# Patient Record
Sex: MALE | Race: WHITE | HISPANIC OR LATINO | Employment: FULL TIME | URBAN - METROPOLITAN AREA
[De-identification: names, ages, dates, MRNs, and addresses within clinical notes are randomized per-mention and may not be internally consistent; named-entity substitution may affect disease eponyms.]

---
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
## 2022-03-29 ENCOUNTER — HOSPITAL ENCOUNTER (EMERGENCY)
Facility: MEDICAL CENTER | Age: 22
End: 2022-03-29
Attending: EMERGENCY MEDICINE

## 2022-03-29 VITALS
HEIGHT: 67 IN | BODY MASS INDEX: 32.6 KG/M2 | TEMPERATURE: 100.6 F | DIASTOLIC BLOOD PRESSURE: 72 MMHG | HEART RATE: 76 BPM | SYSTOLIC BLOOD PRESSURE: 127 MMHG | RESPIRATION RATE: 16 BRPM | WEIGHT: 207.67 LBS | OXYGEN SATURATION: 96 %

## 2022-03-29 DIAGNOSIS — J02.9 SORE THROAT: ICD-10-CM

## 2022-03-29 DIAGNOSIS — B34.9 VIRAL SYNDROME: ICD-10-CM

## 2022-03-29 LAB — S PYO DNA SPEC NAA+PROBE: NOT DETECTED

## 2022-03-29 PROCEDURE — 99283 EMERGENCY DEPT VISIT LOW MDM: CPT

## 2022-03-29 PROCEDURE — 700111 HCHG RX REV CODE 636 W/ 250 OVERRIDE (IP): Performed by: EMERGENCY MEDICINE

## 2022-03-29 PROCEDURE — 87651 STREP A DNA AMP PROBE: CPT

## 2022-03-29 PROCEDURE — 700102 HCHG RX REV CODE 250 W/ 637 OVERRIDE(OP): Performed by: EMERGENCY MEDICINE

## 2022-03-29 PROCEDURE — A9270 NON-COVERED ITEM OR SERVICE: HCPCS | Performed by: EMERGENCY MEDICINE

## 2022-03-29 RX ORDER — ACETAMINOPHEN 500 MG
1000 TABLET ORAL ONCE
Status: COMPLETED | OUTPATIENT
Start: 2022-03-29 | End: 2022-03-29

## 2022-03-29 RX ORDER — PREDNISONE 20 MG/1
60 TABLET ORAL ONCE
Status: COMPLETED | OUTPATIENT
Start: 2022-03-29 | End: 2022-03-29

## 2022-03-29 RX ADMIN — ACETAMINOPHEN 1000 MG: 500 TABLET ORAL at 19:54

## 2022-03-29 RX ADMIN — PREDNISONE 60 MG: 20 TABLET ORAL at 18:44

## 2022-03-30 NOTE — ED NOTES
Chief Complaint   Patient presents with   • Fever     This morning   • Sore Throat     Pt ambulated to triage with above complaints. Took tylenol 5am, vaccinated for covid-19

## 2022-03-30 NOTE — ED NOTES
Pt was medicated. Pt was Dc with all follow up care. All questions were answered. Pt is ambulatory at [place].

## 2022-03-30 NOTE — ED PROVIDER NOTES
ED Provider Note    Scribed for Олег Pritchett M.D. by Fletcher Avalos. 3/29/2022  5:54 PM    Primary care provider: None reported.   Means of arrival: Walk-in  History obtained from: Patient  History limited by: None    CHIEF COMPLAINT  Chief Complaint   Patient presents with    Fever     This morning    Sore Throat       HPI  Mohamud Sheehan is a 21 y.o. male who presents to the Emergency Department for evaluation of fatigue onset this morning. He states that he woke up this morning not feeling well and after working for two hours he began experiencing a sore throat and low grade fever. Additionally, he notes that while working he began feeling weak and his back started to ache prompting him to present to the ED for evaluation. He denies any cough, change in taste or smell, dysuria, hematuria, abdominal pain, nausea, or runny nose. He took tylenol this morning, to mild alleviation. He is vaccinated for COVID-19. He denies any major past medical history.     REVIEW OF SYSTEMS  Pertinent positives include fever, headache, fatigue, generalized weakness, sore throat, and aching back pain. Pertinent negatives include no cough, change in taste or smell, dysuria, hematuria, abdominal pain, nausea, or runny nose.      PAST MEDICAL HISTORY   None reported.     SURGICAL HISTORY  patient denies any surgical history    SOCIAL HISTORY  Social History     Tobacco Use    Smoking status: Never Smoker    Smokeless tobacco: Never Used   Substance Use Topics    Alcohol use: No    Drug use: No      Social History     Substance and Sexual Activity   Drug Use No       FAMILY HISTORY  None reported.     CURRENT MEDICATIONS  Home Medications       Reviewed by Edie Basilio R.N. (Registered Nurse) on 03/29/22 at 1703  Med List Status: Complete     Medication Last Dose Status   ondansetron (ZOFRAN ODT) 4 MG TABLET DISPERSIBLE  Active                    ALLERGIES  No Known Allergies    PHYSICAL EXAM  VITAL SIGNS: /75    "Pulse (!) 115   Temp 37.6 °C (99.7 °F) (Temporal)   Resp 18   Ht 1.702 m (5' 7\")   Wt 94.2 kg (207 lb 10.8 oz)   SpO2 96%   BMI 32.53 kg/m²     Constitutional: Well developed, Well nourished, Mild distress, Non-toxic appearance.   HENT: Normocephalic, Atraumatic.  Oropharynx moist. Posterior nasal drainage, Tonsils slightly erythematous with slight exudate.  Eyes: PERRL, EOMI, Conjunctiva normal, No discharge.   Lymphatic: Positive cervical lymphadenopathy noted.   CV: Good pulses  Thorax & Lungs: No respiratory distress.   Skin: Warm, Dry, No erythema, No rash.    Musculoskeletal: No major deformities noted.   Neurologic: Awake, alert. Moves all extremities spontaneously.  Psychiatric: Affect normal, Mood normal.      LABS  Results for orders placed or performed during the hospital encounter of 03/29/22   Group A Strep by PCR    Specimen: Throat   Result Value Ref Range    Group A Strep by PCR Not Detected Not Detected         COURSE & MEDICAL DECISION MAKING  Nursing notes, VS, PMSFHx reviewed in chart.    5:54 PM - Patient seen and examined at bedside. Patient will be treated with deltasone tablet 60 mg. Ordered group A strep by PCR to evaluate his symptoms.     Decision Making:  Fever, sore throat, strep is negative.  We will have the patient take Tylenol ibuprofen for his symptoms, give the patient a dose of prednisone here.  Will discharge patient home, have the patient return with worsening symptoms.     The patient will return for new or worsening symptoms and is stable at the time of discharge.    The patient is referred to a primary physician for blood pressure management, diabetic screening, and for all other preventative health concerns.        DISPOSITION:  Patient will be discharged home in stable condition.    FOLLOW UP:  [facility], Emergency Dept  [address] [facility] [address] [phone]    If symptoms worsen    OUTPATIENT MEDICATIONS:  Discharge Medication " List as of 3/29/2022  7:59 PM          FINAL IMPRESSION  1. Sore throat    2. Viral syndrome          I, Fletcher Avalos (Scribe), am scribing for, and in the presence of, Олег Pritchett M.D..    Electronically signed by: Fletcher Avalos (Scribteddy), 3/29/2022    Олег HOYOS M.D. personally performed the services described in this documentation, as scribed by Fletcher Avalos in my presence, and it is both accurate and complete.    The note accurately reflects work and decisions made by me.  Олег Pritchett M.D.  3/29/2022  8:30 PM

## 2023-05-11 ENCOUNTER — NON-PROVIDER VISIT (OUTPATIENT)
Dept: OCCUPATIONAL MEDICINE | Facility: CLINIC | Age: 23
End: 2023-05-11

## 2023-05-11 DIAGNOSIS — Z02.1 PRE-EMPLOYMENT DRUG SCREENING: ICD-10-CM

## 2023-05-11 LAB
AMP AMPHETAMINE: NORMAL
COC COCAINE: NORMAL
INT CON NEG: NORMAL
INT CON POS: NORMAL
MET METHAMPHETAMINES: NORMAL
OPI OPIATES: NORMAL
PCP PHENCYCLIDINE: NORMAL
POC DRUG COMMENT 753798-OCCUPATIONAL HEALTH: NEGATIVE
THC: NORMAL

## 2023-05-11 PROCEDURE — 80305 DRUG TEST PRSMV DIR OPT OBS: CPT | Performed by: PREVENTIVE MEDICINE
